# Patient Record
Sex: FEMALE | Race: WHITE | NOT HISPANIC OR LATINO | URBAN - NONMETROPOLITAN AREA
[De-identification: names, ages, dates, MRNs, and addresses within clinical notes are randomized per-mention and may not be internally consistent; named-entity substitution may affect disease eponyms.]

---

## 2022-03-24 ENCOUNTER — APPOINTMENT (RX ONLY)
Dept: URBAN - NONMETROPOLITAN AREA CLINIC 26 | Facility: CLINIC | Age: 82
Setting detail: DERMATOLOGY
End: 2022-03-24

## 2022-03-24 DIAGNOSIS — L82.1 OTHER SEBORRHEIC KERATOSIS: ICD-10-CM | Status: STABLE

## 2022-03-24 DIAGNOSIS — L98.2 FEBRILE NEUTROPHILIC DERMATOSIS [SWEET]: ICD-10-CM

## 2022-03-24 PROCEDURE — 99202 OFFICE O/P NEW SF 15 MIN: CPT

## 2022-03-24 PROCEDURE — ? PHOTO-DOCUMENTATION

## 2022-03-24 PROCEDURE — ? COUNSELING

## 2022-03-24 ASSESSMENT — LOCATION ZONE DERM
LOCATION ZONE: TRUNK
LOCATION ZONE: FEET

## 2022-03-24 ASSESSMENT — LOCATION DETAILED DESCRIPTION DERM
LOCATION DETAILED: RIGHT LATERAL PLANTAR FOOT
LOCATION DETAILED: LEFT MID-UPPER BACK

## 2022-03-24 ASSESSMENT — LOCATION SIMPLE DESCRIPTION DERM
LOCATION SIMPLE: LEFT UPPER BACK
LOCATION SIMPLE: RIGHT FOOT

## 2022-03-24 NOTE — PROCEDURE: COUNSELING
Detail Level: Simple
Patient Specific Counseling (Will Not Stick From Patient To Patient): PATH REVIEWED AND LITERATURE SEARCH PERFORMED. I COULD FIND NO RELEVANT ARTICLES ABOUT SIMILAR FINDINGS. WILL CONSIDER IL TIAM AND SEE IF THIS COULD IMPROVE THE LESIONS.
Detail Level: Zone

## 2022-03-24 NOTE — HPI: SKIN LESIONS
How Severe Is Your Skin Lesion?: moderate
Have Your Skin Lesions Been Treated?: not been treated
Is This A New Presentation, Or A Follow-Up?: Growths
Additional History: A SINGLE LESIONS WAS BIOPSIED IN 2017 BY PODIATRY WHICH WAS READ BY JENNIFER DORADO AS: GRANULOMATOUS AND NEUTROPHILIC INFLAMMATION. PT REPORTS THAT AN RF WAS DONE AND SHE HAS NO RA.
How Severe Is Your Skin Lesion?: mild
Is This A New Presentation, Or A Follow-Up?: Skin Lesions